# Patient Record
Sex: MALE
[De-identification: names, ages, dates, MRNs, and addresses within clinical notes are randomized per-mention and may not be internally consistent; named-entity substitution may affect disease eponyms.]

---
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
## 2019-03-08 ENCOUNTER — RECORD ABSTRACTING (OUTPATIENT)
Age: 24
End: 2019-03-08

## 2019-03-08 PROBLEM — Z00.00 ENCOUNTER FOR PREVENTIVE HEALTH EXAMINATION: Status: ACTIVE | Noted: 2019-03-08

## 2023-10-02 ENCOUNTER — NON-APPOINTMENT (OUTPATIENT)
Age: 28
End: 2023-10-02

## 2025-01-21 ENCOUNTER — EMERGENCY (EMERGENCY)
Facility: HOSPITAL | Age: 30
LOS: 1 days | Discharge: HOME | End: 2025-01-21
Attending: EMERGENCY MEDICINE
Payer: MEDICAID

## 2025-01-21 VITALS
OXYGEN SATURATION: 100 % | TEMPERATURE: 98 F | RESPIRATION RATE: 18 BRPM | HEART RATE: 67 BPM | DIASTOLIC BLOOD PRESSURE: 74 MMHG | SYSTOLIC BLOOD PRESSURE: 121 MMHG

## 2025-01-21 VITALS
HEART RATE: 77 BPM | SYSTOLIC BLOOD PRESSURE: 122 MMHG | TEMPERATURE: 98 F | WEIGHT: 207.01 LBS | RESPIRATION RATE: 18 BRPM | OXYGEN SATURATION: 98 % | DIASTOLIC BLOOD PRESSURE: 80 MMHG

## 2025-01-21 LAB
ALBUMIN SERPL ELPH-MCNC: 4.1 G/DL — SIGNIFICANT CHANGE UP (ref 3.3–5.2)
ALP SERPL-CCNC: 53 U/L — SIGNIFICANT CHANGE UP (ref 40–120)
ALT FLD-CCNC: 31 U/L — SIGNIFICANT CHANGE UP
ANION GAP SERPL CALC-SCNC: 16 MMOL/L — SIGNIFICANT CHANGE UP (ref 5–17)
APAP SERPL-MCNC: <3 UG/ML — LOW (ref 10–26)
APPEARANCE UR: CLEAR — SIGNIFICANT CHANGE UP
AST SERPL-CCNC: 40 U/L — HIGH
BASOPHILS # BLD AUTO: 0.06 K/UL — SIGNIFICANT CHANGE UP (ref 0–0.2)
BASOPHILS NFR BLD AUTO: 1 % — SIGNIFICANT CHANGE UP (ref 0–2)
BILIRUB SERPL-MCNC: 0.4 MG/DL — SIGNIFICANT CHANGE UP (ref 0.4–2)
BILIRUB UR-MCNC: NEGATIVE — SIGNIFICANT CHANGE UP
BUN SERPL-MCNC: 16.7 MG/DL — SIGNIFICANT CHANGE UP (ref 8–20)
CALCIUM SERPL-MCNC: 9.1 MG/DL — SIGNIFICANT CHANGE UP (ref 8.4–10.5)
CHLORIDE SERPL-SCNC: 99 MMOL/L — SIGNIFICANT CHANGE UP (ref 96–108)
CO2 SERPL-SCNC: 27 MMOL/L — SIGNIFICANT CHANGE UP (ref 22–29)
COLOR SPEC: YELLOW — SIGNIFICANT CHANGE UP
CREAT SERPL-MCNC: 1.27 MG/DL — SIGNIFICANT CHANGE UP (ref 0.5–1.3)
DIFF PNL FLD: NEGATIVE — SIGNIFICANT CHANGE UP
EGFR: 78 ML/MIN/1.73M2 — SIGNIFICANT CHANGE UP
EOSINOPHIL # BLD AUTO: 0.04 K/UL — SIGNIFICANT CHANGE UP (ref 0–0.5)
EOSINOPHIL NFR BLD AUTO: 0.7 % — SIGNIFICANT CHANGE UP (ref 0–6)
ETHANOL SERPL-MCNC: <10 MG/DL — SIGNIFICANT CHANGE UP (ref 0–9)
GLUCOSE SERPL-MCNC: 85 MG/DL — SIGNIFICANT CHANGE UP (ref 70–99)
GLUCOSE UR QL: NEGATIVE MG/DL — SIGNIFICANT CHANGE UP
HCT VFR BLD CALC: 40.1 % — SIGNIFICANT CHANGE UP (ref 39–50)
HGB BLD-MCNC: 13.6 G/DL — SIGNIFICANT CHANGE UP (ref 13–17)
IMM GRANULOCYTES NFR BLD AUTO: 0.2 % — SIGNIFICANT CHANGE UP (ref 0–0.9)
KETONES UR-MCNC: ABNORMAL MG/DL
LEUKOCYTE ESTERASE UR-ACNC: NEGATIVE — SIGNIFICANT CHANGE UP
LYMPHOCYTES # BLD AUTO: 1.41 K/UL — SIGNIFICANT CHANGE UP (ref 1–3.3)
LYMPHOCYTES # BLD AUTO: 24 % — SIGNIFICANT CHANGE UP (ref 13–44)
MCHC RBC-ENTMCNC: 25.7 PG — LOW (ref 27–34)
MCHC RBC-ENTMCNC: 33.9 G/DL — SIGNIFICANT CHANGE UP (ref 32–36)
MCV RBC AUTO: 75.8 FL — LOW (ref 80–100)
MONOCYTES # BLD AUTO: 0.47 K/UL — SIGNIFICANT CHANGE UP (ref 0–0.9)
MONOCYTES NFR BLD AUTO: 8 % — SIGNIFICANT CHANGE UP (ref 2–14)
NEUTROPHILS # BLD AUTO: 3.89 K/UL — SIGNIFICANT CHANGE UP (ref 1.8–7.4)
NEUTROPHILS NFR BLD AUTO: 66.1 % — SIGNIFICANT CHANGE UP (ref 43–77)
NITRITE UR-MCNC: NEGATIVE — SIGNIFICANT CHANGE UP
PH UR: 6 — SIGNIFICANT CHANGE UP (ref 5–8)
PLATELET # BLD AUTO: 374 K/UL — SIGNIFICANT CHANGE UP (ref 150–400)
POTASSIUM SERPL-MCNC: 4.2 MMOL/L — SIGNIFICANT CHANGE UP (ref 3.5–5.3)
POTASSIUM SERPL-SCNC: 4.2 MMOL/L — SIGNIFICANT CHANGE UP (ref 3.5–5.3)
PROT SERPL-MCNC: 7 G/DL — SIGNIFICANT CHANGE UP (ref 6.6–8.7)
PROT UR-MCNC: SIGNIFICANT CHANGE UP MG/DL
RBC # BLD: 5.29 M/UL — SIGNIFICANT CHANGE UP (ref 4.2–5.8)
RBC # FLD: 15.1 % — HIGH (ref 10.3–14.5)
SALICYLATES SERPL-MCNC: <0.6 MG/DL — LOW (ref 10–20)
SODIUM SERPL-SCNC: 142 MMOL/L — SIGNIFICANT CHANGE UP (ref 135–145)
SP GR SPEC: >1.03 — HIGH (ref 1–1.03)
UROBILINOGEN FLD QL: 1 MG/DL — SIGNIFICANT CHANGE UP (ref 0.2–1)
WBC # BLD: 5.88 K/UL — SIGNIFICANT CHANGE UP (ref 3.8–10.5)
WBC # FLD AUTO: 5.88 K/UL — SIGNIFICANT CHANGE UP (ref 3.8–10.5)

## 2025-01-21 RX ORDER — SODIUM CHLORIDE 9 MG/ML
1000 INJECTION, SOLUTION INTRAMUSCULAR; INTRAVENOUS; SUBCUTANEOUS ONCE
Refills: 0 | Status: COMPLETED | OUTPATIENT
Start: 2025-01-21 | End: 2025-01-21

## 2025-01-21 RX ORDER — LIDOCAINE HYDROCHLORIDE,EPINEPHRINE BITARTRATE 20; .005 MG/ML; MG/ML
10 INJECTION, SOLUTION EPIDURAL; INFILTRATION; INTRACAUDAL; PERINEURAL ONCE
Refills: 0 | Status: COMPLETED | OUTPATIENT
Start: 2025-01-21 | End: 2025-01-21

## 2025-01-21 RX ORDER — CLOSTRIDIUM TETANI TOXOID ANTIGEN (FORMALDEHYDE INACTIVATED), CORYNEBACTERIUM DIPHTHERIAE TOXOID ANTIGEN (FORMALDEHYDE INACTIVATED), BORDETELLA PERTUSSIS TOXOID ANTIGEN (GLUTARALDEHYDE INACTIVATED), BORDETELLA PERTUSSIS FILAMENTOUS HEMAGGLUTININ ANTIGEN (FORMALDEHYDE INACTIVATED), BORDETELLA PERTUSSIS PERTACTIN ANTIGEN, AND BORDETELLA PERTUSSIS FIMBRIAE 2/3 ANTIGEN 5; 2; 2.5; 5; 3; 5 [LF]/.5ML; [LF]/.5ML; UG/.5ML; UG/.5ML; UG/.5ML; UG/.5ML
0.5 INJECTION, SUSPENSION INTRAMUSCULAR ONCE
Refills: 0 | Status: COMPLETED | OUTPATIENT
Start: 2025-01-21 | End: 2025-01-21

## 2025-01-21 RX ADMIN — CLOSTRIDIUM TETANI TOXOID ANTIGEN (FORMALDEHYDE INACTIVATED), CORYNEBACTERIUM DIPHTHERIAE TOXOID ANTIGEN (FORMALDEHYDE INACTIVATED), BORDETELLA PERTUSSIS TOXOID ANTIGEN (GLUTARALDEHYDE INACTIVATED), BORDETELLA PERTUSSIS FILAMENTOUS HEMAGGLUTININ ANTIGEN (FORMALDEHYDE INACTIVATED), BORDETELLA PERTUSSIS PERTACTIN ANTIGEN, AND BORDETELLA PERTUSSIS FIMBRIAE 2/3 ANTIGEN 0.5 MILLILITER(S): 5; 2; 2.5; 5; 3; 5 INJECTION, SUSPENSION INTRAMUSCULAR at 13:23

## 2025-01-21 RX ADMIN — LIDOCAINE HYDROCHLORIDE,EPINEPHRINE BITARTRATE 10 MILLILITER(S): 20; .005 INJECTION, SOLUTION EPIDURAL; INFILTRATION; INTRACAUDAL; PERINEURAL at 15:00

## 2025-01-21 RX ADMIN — SODIUM CHLORIDE 1000 MILLILITER(S): 9 INJECTION, SOLUTION INTRAMUSCULAR; INTRAVENOUS; SUBCUTANEOUS at 13:23

## 2025-01-21 NOTE — CHART NOTE - NSCHARTNOTEFT_GEN_A_CORE
SW Note: MD requested SW talk with pt about linkage for MH providers. Met with pt and he stated he is a client of [facility] residential program in [facility]. for the past 3 months. He is connected to a MH provider thru [facility] but doesn't feel he is connecting well and was asking about seeing a provider outside of the program. Explained to the pt that most insurance co. will only auth providers affiliated with the residential program he is a client of. He could either contact his ins to request info about that or possibly speak to someone at [facility] about changing his provider. Pt also requested info about seeing a dentist.   Discussed with pt a referral to the [facility] since he has medicaid. Pt would benefit form community outreach. pt accepted referral and provided cell# for contact [phone]. Referral made
VALDO Note: VALDO made aware by ED provider that pt is stable for d/c back to [facility] in [place]. VALDO spoke with Jade at [facility] in [place] ([phone]) who confirmed that pt can return via medicaid medical taxi with his d/c paperwork. VALDO called [facility] and spoke to Rosa ([phone]) who arranged pt a taxi back to [facility] at 4:45 PM through provider Austin. Confirmation #[id number]. VALDO met with pt at bedside and informed him of being stable to return to [facility]. VALDO provided pt with [employer] Sheet with Taxi information on it. VALDO instructed pt to wait in the ED Lobby for his taxi. Pt aware and in agreement with scheduled taxi ride. No further SW needs noted at this time. SW signing off.

## 2025-01-21 NOTE — ED ADULT TRIAGE NOTE - CHIEF COMPLAINT QUOTE
Pt BIBA from [facility] s/p fall.  Pt has lac to back of head.  Bleeding controlled by pressure bandage.  Pt states he gets episodes of dizziness he believes may be vertigo.  Occurs with position change.  As per EMS, staff at [facility] is requesting psych eval.

## 2025-01-21 NOTE — ED PROVIDER NOTE - NSFOLLOWUPINSTRUCTIONS_ED_ALL_ED_FT
PLEASE REDUCE YOUR HYDROCHLOROTHIAZIDE DOSE BY HALF.    RETURN TO THE ED IN 7-10 DAYS FOR REMOVAL OF SCALP STAPLES.    Syncope    Syncope is when you temporarily lose consciousness, also called fainting or passing out. It is caused by a sudden decrease in blood flow to the brain. Even though most causes of syncope are not dangerous, syncope can possibly be a sign of a serious medical problem. Signs that you may be about to faint include feeling dizzy, lightheaded, nausea, visual changes, or cold/clammy skin. Do not drive, operate heavy machinery, or play sports until your health care provider says it is okay.    SEEK IMMEDIATE MEDICAL CARE IF YOU HAVE ANY OF THE FOLLOWING SYMPTOMS: severe headache, pain in your chest/abdomen/back, bleeding from your mouth or rectum, palpitations, shortness of breath, pain with breathing, seizure, confusion, or trouble walking.

## 2025-01-21 NOTE — ED PROVIDER NOTE - OBJECTIVE STATEMENT
29-year-old male, history of bipolar, alcohol abuse, currently residing at [facility]; complaining of head injury status post trip and fall.  Patient states he may have syncopized.  States over the last few months has had 4-5 episodes of feeling dizzy, lightheaded especially when he wakes up in the morning, the same thing occurred today.  He tripped and fell on his way up the stairs, hitting the left side of his head.  Patient takes hydrochlorothiazide and lisinopril, but does not know what the doses are.  In addition, he takes 3 other medications for his behavioral health history.

## 2025-01-21 NOTE — ED ADULT NURSE NOTE - OBJECTIVE STATEMENT
P Pt presents s/p a fall while residing at [facility] + small lac to back of head, no active bleeding, denies loc, . pt also reports that he has been " falling a lot" in the past 6 months. never had a work up. denies dizziness, chest pain  or lightheadedness prior to falling. + sick contacts ( lives with 40 people in a house)

## 2025-01-21 NOTE — ED ADULT NURSE NOTE - NSFALLHARMRISKINTERV_ED_ALL_ED

## 2025-01-21 NOTE — ED PROVIDER NOTE - CLINICAL SUMMARY MEDICAL DECISION MAKING FREE TEXT BOX
syncope; possible related to BP meds, although dose not entirely clear; labs and iamging ok. if ECG ok, safe for d/c. f/u with PCP; possible reduce HCTZ dose

## 2025-01-21 NOTE — ED PROVIDER NOTE - PATIENT PORTAL LINK FT
You can access the FollowMyHealth Patient Portal offered by [facility] by registering at the following website: [email or web address]. By joining [employer]’s FollowMyHealth portal, you will also be able to view your health information using other applications (apps) compatible with our system.

## 2025-02-19 ENCOUNTER — EMERGENCY (EMERGENCY)
Facility: HOSPITAL | Age: 30
LOS: 1 days | Discharge: HOME | End: 2025-02-19
Attending: STUDENT IN AN ORGANIZED HEALTH CARE EDUCATION/TRAINING PROGRAM
Payer: SELF-PAY

## 2025-02-19 VITALS
DIASTOLIC BLOOD PRESSURE: 100 MMHG | OXYGEN SATURATION: 100 % | RESPIRATION RATE: 20 BRPM | WEIGHT: 175.05 LBS | HEART RATE: 123 BPM | HEIGHT: 72 IN | TEMPERATURE: 98 F | SYSTOLIC BLOOD PRESSURE: 166 MMHG

## 2025-02-19 LAB
AMPHET UR-MCNC: NEGATIVE — SIGNIFICANT CHANGE UP
ANION GAP SERPL CALC-SCNC: 19 MMOL/L — HIGH (ref 5–17)
ANISOCYTOSIS BLD QL: SLIGHT — SIGNIFICANT CHANGE UP
APAP SERPL-MCNC: <3 UG/ML — LOW (ref 10–26)
APPEARANCE UR: CLEAR — SIGNIFICANT CHANGE UP
BARBITURATES UR SCN-MCNC: NEGATIVE — SIGNIFICANT CHANGE UP
BASOPHILS # BLD AUTO: 0.06 K/UL — SIGNIFICANT CHANGE UP (ref 0–0.2)
BASOPHILS NFR BLD AUTO: 0.7 % — SIGNIFICANT CHANGE UP (ref 0–2)
BENZODIAZ UR-MCNC: NEGATIVE — SIGNIFICANT CHANGE UP
BILIRUB UR-MCNC: NEGATIVE — SIGNIFICANT CHANGE UP
BUN SERPL-MCNC: 10.2 MG/DL — SIGNIFICANT CHANGE UP (ref 8–20)
CALCIUM SERPL-MCNC: 9 MG/DL — SIGNIFICANT CHANGE UP (ref 8.4–10.5)
CHLORIDE SERPL-SCNC: 96 MMOL/L — SIGNIFICANT CHANGE UP (ref 96–108)
CO2 SERPL-SCNC: 21 MMOL/L — LOW (ref 22–29)
COCAINE METAB.OTHER UR-MCNC: NEGATIVE — SIGNIFICANT CHANGE UP
COLOR SPEC: YELLOW — SIGNIFICANT CHANGE UP
CREAT SERPL-MCNC: 1.08 MG/DL — SIGNIFICANT CHANGE UP (ref 0.5–1.3)
DIFF PNL FLD: NEGATIVE — SIGNIFICANT CHANGE UP
EGFR: 92 ML/MIN/1.73M2 — SIGNIFICANT CHANGE UP
EOSINOPHIL # BLD AUTO: 0.01 K/UL — SIGNIFICANT CHANGE UP (ref 0–0.5)
EOSINOPHIL NFR BLD AUTO: 0.1 % — SIGNIFICANT CHANGE UP (ref 0–6)
ETHANOL SERPL-MCNC: 60 MG/DL — HIGH (ref 0–9)
FENTANYL UR QL SCN: NEGATIVE — SIGNIFICANT CHANGE UP
FLUAV AG NPH QL: SIGNIFICANT CHANGE UP
FLUBV AG NPH QL: SIGNIFICANT CHANGE UP
GLUCOSE SERPL-MCNC: 110 MG/DL — HIGH (ref 70–99)
GLUCOSE UR QL: NEGATIVE MG/DL — SIGNIFICANT CHANGE UP
HCT VFR BLD CALC: 40.9 % — SIGNIFICANT CHANGE UP (ref 39–50)
HGB BLD-MCNC: 14.2 G/DL — SIGNIFICANT CHANGE UP (ref 13–17)
IMM GRANULOCYTES # BLD AUTO: 0.03 K/UL — SIGNIFICANT CHANGE UP (ref 0–0.07)
IMM GRANULOCYTES NFR BLD AUTO: 0.3 % — SIGNIFICANT CHANGE UP (ref 0–0.9)
KETONES UR-MCNC: ABNORMAL MG/DL
LEUKOCYTE ESTERASE UR-ACNC: NEGATIVE — SIGNIFICANT CHANGE UP
LYMPHOCYTES # BLD AUTO: 1.07 K/UL — SIGNIFICANT CHANGE UP (ref 1–3.3)
LYMPHOCYTES NFR BLD AUTO: 12.1 % — LOW (ref 13–44)
MCHC RBC-ENTMCNC: 25.8 PG — LOW (ref 27–34)
MCHC RBC-ENTMCNC: 34.7 G/DL — SIGNIFICANT CHANGE UP (ref 32–36)
MCV RBC AUTO: 74.2 FL — LOW (ref 80–100)
METHADONE UR-MCNC: NEGATIVE — SIGNIFICANT CHANGE UP
MICROCYTES BLD QL: SLIGHT — SIGNIFICANT CHANGE UP
MONOCYTES # BLD AUTO: 0.43 K/UL — SIGNIFICANT CHANGE UP (ref 0–0.9)
MONOCYTES NFR BLD AUTO: 4.9 % — SIGNIFICANT CHANGE UP (ref 2–14)
NEUTROPHILS # BLD AUTO: 7.25 K/UL — SIGNIFICANT CHANGE UP (ref 1.8–7.4)
NEUTROPHILS NFR BLD AUTO: 81.9 % — HIGH (ref 43–77)
NITRITE UR-MCNC: NEGATIVE — SIGNIFICANT CHANGE UP
NRBC # BLD AUTO: 0 K/UL — SIGNIFICANT CHANGE UP (ref 0–0)
NRBC # FLD: 0 K/UL — SIGNIFICANT CHANGE UP (ref 0–0)
NRBC BLD AUTO-RTO: 0 /100 WBCS — SIGNIFICANT CHANGE UP (ref 0–0)
OPIATES UR-MCNC: NEGATIVE — SIGNIFICANT CHANGE UP
PCP SPEC-MCNC: SIGNIFICANT CHANGE UP
PCP UR-MCNC: NEGATIVE — SIGNIFICANT CHANGE UP
PH UR: 8 — SIGNIFICANT CHANGE UP (ref 5–8)
PLAT MORPH BLD: NORMAL — SIGNIFICANT CHANGE UP
PLATELET # BLD AUTO: 319 K/UL — SIGNIFICANT CHANGE UP (ref 150–400)
PMV BLD: 10.9 FL — SIGNIFICANT CHANGE UP (ref 7–13)
POLYCHROMASIA BLD QL SMEAR: SLIGHT — SIGNIFICANT CHANGE UP
POTASSIUM SERPL-MCNC: 3.8 MMOL/L — SIGNIFICANT CHANGE UP (ref 3.5–5.3)
POTASSIUM SERPL-SCNC: 3.8 MMOL/L — SIGNIFICANT CHANGE UP (ref 3.5–5.3)
PROT UR-MCNC: NEGATIVE MG/DL — SIGNIFICANT CHANGE UP
RBC # BLD: 5.51 M/UL — SIGNIFICANT CHANGE UP (ref 4.2–5.8)
RBC # FLD: 15.5 % — HIGH (ref 10.3–14.5)
RBC BLD AUTO: ABNORMAL
RSV RNA NPH QL NAA+NON-PROBE: SIGNIFICANT CHANGE UP
SALICYLATES SERPL-MCNC: <0.6 MG/DL — LOW (ref 10–20)
SARS-COV-2 RNA SPEC QL NAA+PROBE: SIGNIFICANT CHANGE UP
SODIUM SERPL-SCNC: 136 MMOL/L — SIGNIFICANT CHANGE UP (ref 135–145)
SP GR SPEC: 1.02 — SIGNIFICANT CHANGE UP (ref 1–1.03)
TARGETS BLD QL SMEAR: SLIGHT — SIGNIFICANT CHANGE UP
THC UR QL: NEGATIVE — SIGNIFICANT CHANGE UP
UROBILINOGEN FLD QL: 0.2 MG/DL — SIGNIFICANT CHANGE UP (ref 0.2–1)
WBC # BLD: 8.85 K/UL — SIGNIFICANT CHANGE UP (ref 3.8–10.5)
WBC # FLD AUTO: 8.85 K/UL — SIGNIFICANT CHANGE UP (ref 3.8–10.5)

## 2025-02-19 NOTE — ED PROVIDER NOTE - PROGRESS NOTE DETAILS
Ramos: Pt received in signout. Medically cleared. Spoke with telepsych -- plan for voluntary admission.

## 2025-02-19 NOTE — ED ADULT NURSE NOTE - CHIEF COMPLAINT QUOTE
pt BIBA from home, arrives not speaking, had mother on facetime. as per mother has hx of substance and psych hx. was just at [facility] d/c 1 month ago.as per mother has not taken any medication. etoh odor on breath, admits to drinking "a few" four lokos this AM. changed into yellow gown, belongings secured. mother phone # [phone]

## 2025-02-19 NOTE — ED ADULT NURSE NOTE - HPI (INCLUDE ILLNESS QUALITY, SEVERITY, DURATION, TIMING, CONTEXT, MODIFYING FACTORS, ASSOCIATED SIGNS AND SYMPTOMS)
Patient comes to  after being medically cleared in the main ED. Pt name is Grabiel Hanson, hx of ETOH abuse and d/c'd from [facility] about a month ago for substance use. Pt also prescribed medications for dx of schizoaffective d/o. Pt states he has not taken meds for mental health since discharge, however. Admits to drinking ETOH prior to coming to hospital, BAL=60. He is oddly related when spoken to. Aware he is in  to speak to psychiatrist, but states he doesn't "really think that's important, but whatever". UTOX negative.

## 2025-02-19 NOTE — ED ADULT TRIAGE NOTE - CHIEF COMPLAINT QUOTE
pt BIBA from home, arrives not speaking, had mother on facetime. as per mother has hx of substance and psych hx. was just at [facility] d/c 1 month ago.as per mother has not taken any medication. etoh odor on breath, admits to drinking "a few" four lokos this AM. changed into yellow gown, belongings secured. mother phone # [phone]

## 2025-02-19 NOTE — ED ADULT TRIAGE NOTE - PAIN: PRESENCE, MLM
\"Have you been to the ER, urgent care clinic since your last visit?  Hospitalized since your last visit?\"    NO    “Have you seen or consulted any other health care providers outside of [facility] since your last visit?”    NO            Click Here for Release of Records Request   denies pain/discomfort (Rating = 0)

## 2025-02-19 NOTE — ED ADULT NURSE REASSESSMENT NOTE - NS ED NURSE REASSESS COMMENT FT1
Assumed patient care at 7:00 PM. Patient in the community area asking for phone. Patient voiced no complains,  denied visual and auditory hallucination, no agitation noted, no attempt to self harm or to harm others, safety maintained.

## 2025-02-19 NOTE — ED PROVIDER NOTE - CLINICAL SUMMARY MEDICAL DECISION MAKING FREE TEXT BOX
35 yom bipolar, alcohol abuse here with multiple complaints. To triage, patient would not speak to them but mom was on his facetime who reported he was recently in alcohol detox program. since being home hasn't had his psych meds. Speaking with him, patient feels a lot of stressors, reports recently homeless and hasn't been taking his meds. Feels overwhelmed and with passive SI. Drank this morning and possible hit his head. Denies n/v, blurry vision, cp, sob, abd pain.   AP - speaking with mother Nemo [phone], patient's name is Grabiel Hanson 1995, she is concerned about him psychiatrically. no signs of withdrawal at this time, signed out pending psych eval

## 2025-02-19 NOTE — ED PROVIDER NOTE - OBJECTIVE STATEMENT
35 yom bipolar, alcohol abuse here with multiple complaints. To triage, patient would not speak to them but mom was on his facetime who reported he was recently in alcohol detox program. since being home hasn't had his psych meds. Speaking with him, patient feels a lot 35 yom bipolar, alcohol abuse here with multiple complaints. To triage, patient would not speak to them but mom was on his facetime who reported he was recently in alcohol detox program. since being home hasn't had his psych meds. Speaking with him, patient feels a lot of stressors, reports recently homeless and hasn't been taking his meds. Feels overwhelmed and with passive SI. Drank this morning and possible hit his head. Denies n/v, blurry vision, cp, sob, abd pain.

## 2025-02-20 VITALS
SYSTOLIC BLOOD PRESSURE: 131 MMHG | RESPIRATION RATE: 18 BRPM | DIASTOLIC BLOOD PRESSURE: 82 MMHG | OXYGEN SATURATION: 100 % | TEMPERATURE: 98 F | HEART RATE: 81 BPM

## 2025-02-20 RX ORDER — GABAPENTIN 800 MG/1
1 TABLET ORAL
Qty: 90 | Refills: 0
Start: 2025-02-20 | End: 2025-03-21

## 2025-02-20 RX ORDER — GABAPENTIN 800 MG/1
400 TABLET ORAL THREE TIMES A DAY
Refills: 0 | Status: ACTIVE | OUTPATIENT
Start: 2025-02-20 | End: 2026-01-19

## 2025-02-20 RX ORDER — NALTREXONE HCL 50 MG
380 TABLET ORAL ONCE
Refills: 0 | Status: DISCONTINUED | OUTPATIENT
Start: 2025-02-20 | End: 2025-02-20

## 2025-02-20 RX ADMIN — GABAPENTIN 400 MILLIGRAM(S): 800 TABLET ORAL at 13:33

## 2025-02-20 NOTE — ED BEHAVIORAL HEALTH ASSESSMENT NOTE - SOURCE OF INFORMATION
Patient clear to auscultation bilaterally/no wheezes/no rales/no respiratory distress/airway patent/breath sounds equal/good air movement/respirations non-labored

## 2025-02-20 NOTE — ED ADULT NURSE REASSESSMENT NOTE - NS ED NURSE REASSESS COMMENT FT1
Introduced to patient, pt was watching [employer] after speaking with the physician. Pt denies any feelings of anxiety or anger, pt is resting comfortably in the chair in the day room, pt educated on the plan of care, education deemed successful after teach back shows proficiency.

## 2025-02-20 NOTE — ED BEHAVIORAL HEALTH ASSESSMENT NOTE - PSYCHIATRIC ISSUES AND PLAN (INCLUDE STANDING AND PRN MEDICATION)
Continue with home Wellbutrin 300 mg daily, Fluoxetine 20 mg daily, Gabapentin 300 mg BID PRN for anxiety, Prazosin 2 mg qHS. For agitation, can offer PO/IM Haldol 5 mg/Ativan 2 mg Q6H PRN. Hold antipsychotics  if QTC>500

## 2025-02-20 NOTE — ED CDU PROVIDER INITIAL DAY NOTE - OBJECTIVE STATEMENT
35 yom bipolar, alcohol abuse here with multiple complaints. To triage, patient would not speak to them but mom was on his facetime who reported he was recently in alcohol detox program. since being home hasn't had his psych meds. Speaking with him, patient feels a lot of stressors, reports recently homeless and hasn't been taking his meds. Feels overwhelmed and with passive SI. Drank this morning and possible hit his head. Denies n/v, blurry vision, cp, sob, abd pain.
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
## 2025-02-20 NOTE — ED CDU PROVIDER DISPOSITION NOTE - PATIENT PORTAL LINK FT
You can access the FollowMyHealth Patient Portal offered by [facility] by registering at the following website: [email or web address]. By joining [employer]’s FollowMyHealth portal, you will also be able to view your health information using other applications (apps) compatible with our system.

## 2025-02-20 NOTE — ED BEHAVIORAL HEALTH NOTE - BEHAVIORAL HEALTH NOTE
LAURITA Note: Notified by  provider that the pt was cleared for d/c. Dr. Queen met with pt and spoke with his mother via phone. SW met with pt and confirmed he does not want IPP care and would like to attend an o/p program for substance abuse tx. Pt stated he is familiar with the agency [facility] and has already outreached them for an intake. He plans to go to the clinic today and talk to a staff member to get connected to tx. Offered to call [facility] to try to assist with getting pt linked to tx and he declined. Provided pt with a flyer given by out by [facility] that has contact info and a QR code to get an appt. Pt used his own funds for transportation

## 2025-02-20 NOTE — ED BEHAVIORAL HEALTH ASSESSMENT NOTE - PAST PSYCHOTROPIC MEDICATION
Per PSYCKES: Abilify, Geodon, PO Naltrexone, Rexulti, Lybalvi, Zyprexa, Zoloft, Effexor, Remeron, Trintellix, Doxepin, Temazepam, Trazodone, Klonopin, Hydroxyzine, Buspar, Haldol, Seroquel, Tegretol
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
## 2025-02-20 NOTE — ED BEHAVIORAL HEALTH PROGRESS NOTE - DETAILS:
No acute events overnight, no PRNs required. I met with the patient at the bedside who complains of struggling with alcohol use disorder and is preventing him from "building my life into something substantial." He states that he has been drinking for essentially most of his 20s, thinks that alcohol helps with relieving him of his anxiety in the short term despite recognizing the disastrous effects it leads to in the long term. He complains of anxiety at baseline primarily, even when he's sober and found Klonopin to be most helpful in controlling this anxiety though understands that this is not ideal for the treatment of chronic anxiety especially in patients with comorbid alcohol use disorder. He notes that he was using Gabapentin (thinks he is prescribed 400 mg tid) but did not refill the prescription because he "did not want to be dependent on it" and notes that utilizing the Vivitrol injection was also helpful to keep him sober as when he stopped receiving the injection is when he ultimately relapsed. He denies SI/HI, AVH and other perceptual disturbances currently. He does not think that an inpatient psychiatric admission is helpful for him as his last admission in [place] in June 2024 was somewhat helpful but ultimately made him feel even more trapped.    I spoke with mom (Nemo [phone]) who confirms the patient's struggles with anxiety and alcohol use. She expresses no acute safety concerns at this time but agrees that patient does need to get into outpatient treatment at the very least and notes that patient is indeed help-seeking at this time and is aware of what needs to be done to address his issues with substance use and anxiety. Does not have any issues with bringing him back home but reports that she will pick him up later this evening.
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
## 2025-02-20 NOTE — ED ADULT NURSE REASSESSMENT NOTE - NS ED NURSE REASSESS COMMENT FT1
Pt discharged home, updated on the changes to the plan of care, pt did not receive vivitrol shot here and will follow up with [facility] outpatient on discharge, pt states he has an understanding of the change in plan and education deemed successful after teach back shows proficiency. VS performed prior to discharge.
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
## 2025-02-20 NOTE — ED CDU PROVIDER DISPOSITION NOTE - CLINICAL COURSE
Originally patient was brought in for alcohol abuse in the setting of multiple stress factors at home.  Patient originally wanted to pursue voluntary admission but now is requesting to go home psych agrees with him..

## 2025-02-20 NOTE — ED CDU PROVIDER INITIAL DAY NOTE - CLINICAL SUMMARY MEDICAL DECISION MAKING FREE TEXT BOX
Pt w/ hx bipolar, alcohol abuse, presents with depression. Has not been taking his meds. Pt medically cleared. Evaluated by psychiatry -- plan for voluntary admission.

## 2025-02-20 NOTE — ED BEHAVIORAL HEALTH PROGRESS NOTE - RISK ASSESSMENT
The initial plan was to move forward with voluntary psychiatric admission but upon reevaluation today (Which included discussion with patient's mom), we will be proceeding with discharge. Patient certainly has primarily underlying anxiety symptoms which would benefit from treatment but his primary issue at this time is his severe alcohol use disorder for which he is declining inpatient substance use treatment as well as inpatient psychiatric admission. He demonstrates good insight into his alcohol use disorder and what needs to be done in order to address this issue, agreeing to pursue outpatient substance use treatment at this time. Mom does not express acute safety concerns and is agreeable to discharge at this time; patient states that he benefited from the Vivitrol injection which we unfortunately do not have formulary at this hospital, however he found Gabapentin 400 mg tid to be modestly helpful in treating his anxiety so we will send in a prescription for him. Patient is psychiatrically cleared for discharge, mom will be picking up the patient later this evening.

## 2025-02-20 NOTE — ED CDU PROVIDER DISPOSITION NOTE - NSFOLLOWUPINSTRUCTIONS_ED_ALL_ED_FT
WHAT YOU NEED TO KNOW:    What is alcohol abuse? Alcohol abuse means you drink more than the recommended daily or weekly limits. You may be drinking alcohol regularly or drinking large amounts in a short period of time (binge drinking). You continue to drink even though it causes legal, work, or relationship problems.    What do I need to know about recommended alcohol limits? One drink is defined as 12 ounces (oz) of beer, 5 oz of wine, or 1.5 oz of liquor such as whiskey.    Men 21 to 64 years should limit alcohol to 2 drinks a day. Do not have more than 4 drinks in 1 day or more than 14 in 1 week.    All women, and men 65 or older should limit alcohol to 1 drink in a day. Do not have more than 3 drinks in 1 day or more than 7 in 1 week. Do not drink alcohol if you are pregnant.  What are the signs and symptoms of alcohol abuse?    Loss of interest in activities, work, and school    Hiding alcohol, or drinking in private    Depression, or guilt about drinking    Constant thoughts about alcohol    Drinking in the morning to relieve the effects of a hangover    Not being able to control the amount you drink    Restlessness, or erratic and violent behavior  What health problems can alcohol abuse cause?    Cancer in your liver, pancreas, stomach, colon, kidney, or breast    Stroke or a heart attack    Liver, kidney, or lung disease    Blackouts, memory loss, brain damage, or dementia    Diabetes, immune system problems, or thiamine (vitamin B1) deficiency    Problems for you and your baby if you drink while pregnant  How is alcohol abuse treated? Treatment can help you understand the reasons you abuse alcohol. Counselors and therapists provide you with support and help you find ways to cope instead of drinking. You may need inpatient treatment to provide a controlled environment. You may need outpatient treatment after your inpatient treatment is complete.    Detoxification (detox) is a program used to flush alcohol from your body. During detox, medicines are given to help prevent withdrawal symptoms when you stop drinking alcohol.    Brief intervention therapy helps you think about your alcohol use differently. A healthcare provider helps you set goals to decrease the amount of alcohol you drink. Therapy may continue after you leave the hospital.    Vitamin supplements such as B1 may be needed. Alcohol can make it hard for your body to absorb enough vitamin B1. You may be given vitamin B1 if you have low levels. It is also given to prevent brain damage from alcohol use.  What can I do to manage my alcohol use?    Work with healthcare providers on goals to drink less. This can help prevent health problems. For example, you may start by planning your weekly alcohol use. It will be easier to have fewer drinks if you plan ahead.    Have food when you drink alcohol. Food will prevent alcohol from getting into your system too quickly. Eat before you have your first alcohol drink.    Time your drinks carefully. Have no more than 1 drink in an hour. Have a liquid such as water, coffee, or a soft drink between alcohol drinks.    Do not drive if you have had alcohol. Plan ahead so you have a safe ride home. Make sure someone who has not been drinking can help you get home safely. Plan to use a taxi or other ride service, if needed.    Do not drink alcohol if you are taking medicine. Alcohol is dangerous when you combine it with certain medicines, such as acetaminophen or blood pressure medicine. Talk to your healthcare provider about all the medicines you currently take.  Where can I find support and more information?    Alcoholics Anonymous  Web Address: http://www.aa.org  Substance Abuse and Mental Health Services Administration (SAMHSA)  [address]  [place] [address]  Web Address: http://www.samhsa.gov or https://dpt2[facility].gov/treatment/  Call your local emergency number (911 in the ), or have someone call if:    You have sudden chest pain or trouble breathing.    You want to harm yourself or others.    You have a seizure.  When should I seek care immediately?    You cannot stop vomiting or you vomit blood.    When should I call my doctor?    You have hallucinations (you see or hear things that are not real).    You have questions or concerns about your condition or care.  CARE AGREEMENT:    You have the right to help plan your care. Learn about your health condition and how it may be treated. Discuss treatment options with your healthcare providers to decide what care you want to receive. You always have the right to refuse treatment.

## 2025-02-20 NOTE — ED ADULT NURSE REASSESSMENT NOTE - NS ED NURSE REASSESS COMMENT FT1
Patient remain in bed this morning. He is calm and compliant with care, Covid specimen collected and sent to lab. As per tele psych consult patient is a voluntary admission. Awaiting for a bed.

## 2025-03-07 DIAGNOSIS — R56.9 UNSPECIFIED CONVULSIONS: ICD-10-CM

## 2025-03-12 ENCOUNTER — APPOINTMENT (OUTPATIENT)
Dept: NEUROLOGY | Facility: CLINIC | Age: 30
End: 2025-03-12

## 2025-03-12 ENCOUNTER — APPOINTMENT (OUTPATIENT)
Dept: NEUROLOGY | Facility: CLINIC | Age: 30
End: 2025-03-12
Payer: MEDICAID

## 2025-03-12 PROCEDURE — 93040 RHYTHM ECG WITH REPORT: CPT

## 2025-03-12 PROCEDURE — 95816 EEG AWAKE AND DROWSY: CPT

## 2025-03-18 ENCOUNTER — APPOINTMENT (OUTPATIENT)
Dept: NEUROLOGY | Facility: CLINIC | Age: 30
End: 2025-03-18
Payer: MEDICAID

## 2025-03-18 VITALS
DIASTOLIC BLOOD PRESSURE: 85 MMHG | SYSTOLIC BLOOD PRESSURE: 115 MMHG | HEART RATE: 86 BPM | HEIGHT: 72 IN | WEIGHT: 200 LBS | OXYGEN SATURATION: 98 % | BODY MASS INDEX: 27.09 KG/M2

## 2025-03-18 DIAGNOSIS — R55 SYNCOPE AND COLLAPSE: ICD-10-CM

## 2025-03-18 PROCEDURE — 99204 OFFICE O/P NEW MOD 45 MIN: CPT

## 2025-03-18 RX ORDER — BUPROPION HYDROCHLORIDE 300 MG/1
300 TABLET, EXTENDED RELEASE ORAL
Refills: 0 | Status: ACTIVE | COMMUNITY

## 2025-03-18 RX ORDER — HYDROCHLOROTHIAZIDE 25 MG/1
25 TABLET ORAL
Refills: 0 | Status: ACTIVE | COMMUNITY
Start: 2025-03-18

## 2025-03-18 RX ORDER — CLONAZEPAM 0.5 MG/1
0.5 TABLET ORAL
Refills: 0 | Status: ACTIVE | COMMUNITY

## 2025-03-18 RX ORDER — QUETIAPINE 25 MG/1
25 TABLET, FILM COATED ORAL
Refills: 0 | Status: ACTIVE | COMMUNITY

## 2025-03-18 RX ORDER — FLUOXETINE HCL 10 MG
TABLET ORAL
Refills: 0 | Status: ACTIVE | COMMUNITY

## 2025-09-22 PROBLEM — M25.561 ACUTE PAIN OF RIGHT KNEE: Status: ACTIVE | Noted: 2025-09-22

## 2025-09-22 PROBLEM — M23.91 INTERNAL DERANGEMENT OF RIGHT KNEE: Status: ACTIVE | Noted: 2025-09-22
